# Patient Record
Sex: MALE | Race: OTHER
[De-identification: names, ages, dates, MRNs, and addresses within clinical notes are randomized per-mention and may not be internally consistent; named-entity substitution may affect disease eponyms.]

---

## 2018-02-25 ENCOUNTER — HOSPITAL ENCOUNTER (EMERGENCY)
Dept: HOSPITAL 72 - EMR | Age: 68
LOS: 1 days | Discharge: ANOTHER HOSPITAL | End: 2018-02-26
Payer: SELF-PAY

## 2018-02-25 VITALS — WEIGHT: 130 LBS | HEIGHT: 66 IN | BODY MASS INDEX: 20.89 KG/M2

## 2018-02-25 VITALS — DIASTOLIC BLOOD PRESSURE: 82 MMHG | SYSTOLIC BLOOD PRESSURE: 132 MMHG

## 2018-02-25 VITALS — SYSTOLIC BLOOD PRESSURE: 130 MMHG | DIASTOLIC BLOOD PRESSURE: 80 MMHG

## 2018-02-25 VITALS — SYSTOLIC BLOOD PRESSURE: 130 MMHG | DIASTOLIC BLOOD PRESSURE: 95 MMHG

## 2018-02-25 DIAGNOSIS — D18.1: ICD-10-CM

## 2018-02-25 DIAGNOSIS — F10.129: ICD-10-CM

## 2018-02-25 DIAGNOSIS — J32.9: ICD-10-CM

## 2018-02-25 DIAGNOSIS — Z23: ICD-10-CM
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
## 2018-02-25 DIAGNOSIS — S01.112A: ICD-10-CM

## 2018-02-25 DIAGNOSIS — S06.5X0A: Primary | ICD-10-CM
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
## 2018-02-25 LAB
ADD MANUAL DIFF: NO
BASOPHILS NFR BLD AUTO: 0.9 % (ref 0–2)
EOSINOPHIL NFR BLD AUTO: 3.9 % (ref 0–3)
ERYTHROCYTE [DISTWIDTH] IN BLOOD BY AUTOMATED COUNT: 12.6 % (ref 11.6–14.8)
HCT VFR BLD CALC: 44 % (ref 42–52)
HGB BLD-MCNC: 15.6 G/DL (ref 14.2–18)
LYMPHOCYTES NFR BLD AUTO: 20.2 % (ref 20–45)
MCV RBC AUTO: 93 FL (ref 80–99)
MONOCYTES NFR BLD AUTO: 7.6 % (ref 1–10)
NEUTROPHILS NFR BLD AUTO: 67.4 % (ref 45–75)
PLATELET # BLD: 226 K/UL (ref 150–450)
RBC # BLD AUTO: 4.73 M/UL (ref 4.7–6.1)
WBC # BLD AUTO: 5.9 K/UL (ref 4.8–10.8)

## 2018-02-25 PROCEDURE — 90471 IMMUNIZATION ADMIN: CPT

## 2018-02-25 PROCEDURE — 90715 TDAP VACCINE 7 YRS/> IM: CPT

## 2018-02-25 PROCEDURE — 84484 ASSAY OF TROPONIN QUANT: CPT

## 2018-02-25 PROCEDURE — 12011 RPR F/E/E/N/L/M 2.5 CM/<: CPT

## 2018-02-25 PROCEDURE — 80329 ANALGESICS NON-OPIOID 1 OR 2: CPT

## 2018-02-25 PROCEDURE — 80053 COMPREHEN METABOLIC PANEL: CPT

## 2018-02-25 PROCEDURE — 36415 COLL VENOUS BLD VENIPUNCTURE: CPT

## 2018-02-25 PROCEDURE — 93005 ELECTROCARDIOGRAM TRACING: CPT

## 2018-02-25 PROCEDURE — 70450 CT HEAD/BRAIN W/O DYE: CPT

## 2018-02-25 PROCEDURE — 85610 PROTHROMBIN TIME: CPT

## 2018-02-25 PROCEDURE — 99285 EMERGENCY DEPT VISIT HI MDM: CPT

## 2018-02-25 PROCEDURE — 85730 THROMBOPLASTIN TIME PARTIAL: CPT

## 2018-02-25 PROCEDURE — 85025 COMPLETE CBC W/AUTO DIFF WBC: CPT

## 2018-02-25 PROCEDURE — 82550 ASSAY OF CK (CPK): CPT

## 2018-02-25 NOTE — EMERGENCY ROOM REPORT
History of Present Illness


General


Chief Complaint:  Alcohol Intoxication


Source:  Patient, EMS





Present Illness


HPI


Patient brought by EMS after he has been drinking today.  He has a laceration 

of the left side of his forehead.  He can't tell us how this occurred.  He 

states he has some tenderness there but won't rate what it is.  He states his 

greater than 10 years for his last tetanus.  The patient is not answering most 

questions.


Allergies:  


Coded Allergies:  


     No Known Allergies (Unverified , 2/25/18)





Patient History


Limited by:  medical condition


Past Medical History:  see triage record


Social History:  Reports: alcohol use


Social History Narrative


states he works, but won't state job


Reviewed Nursing Documentation:  PMH: Agreed, PSxH: Agreed





Review of Systems


All Other Systems:  limited





Physical Exam





Vital Signs








  Date Time  Temp Pulse Resp B/P (MAP) Pulse Ox O2 Delivery O2 Flow Rate FiO2


 


2/25/18 16:34 97.8 78 16 130/9 98   





 97.9       








Sp02 EP Interpretation:  reviewed, normal


General Appearance:  well appearing, no apparent distress, other - GCS 14


Head:  normocephalic, other - 1 cm laceration L eyebrow


Eyes:  bilateral eye PERRL, bilateral eye Scleral Injection


ENT:  moist mucus membranes - poor dentition


Neck:  full range of motion, supple, no bony tend


Respiratory:  chest non-tender, lungs clear, normal breath sounds


Cardiovascular #1:  regular rate, rhythm, no edema


Cardiovascular #2:  2+ radial (R)


Gastrointestinal:  normal inspection, normal bowel sounds, non tender, no mass, 

non-distended


Musculoskeletal:  back normal, normal range of motion


Neurologic:  alert, motor strength/tone normal, sensory intact, speech normal, 

other - ataxia, oriented - X2


Psychiatric:  other - poor judgement


Reflexes:  2+ knee (R), 2+ knee (L)


Skin:  warm/dry, laceration - L eyebrow





Procedures


Laceration/Wound Repair


Laceration/Wound Repair :  


   Consent:  Verbal


   Wound Location:  face


   Wound's Depth, Shape:  into muscle


   Wound Length (cm):  1


   Wound Explored:  clean


   Irrigated w/ Saline (ccs):  10


   Betadine Prep?:  Yes


   Wound Debrided:  None


   Wound Repaired With:  Dermabond


   Layer Closure?:  No


   Sterile Dressing Applied?:  No


   Patient Tolerated:  Well


   Complications:  None





Medical Decision Making


Medical:  Alcohol Abuse


Behavioral:  Other - subdurals


Reaction to Intervention:  No change


Restraint Reassesment


Still attempting to leave.  Needs transfer to [facility].  Continued NB restraints


Diagnostic Impression:  


 Primary Impression:  


 Subdural hematoma


 Additional Impressions:  


 Subdural hygroma


 Acute alcoholic intoxication


 Qualified Codes:  F10.929 - Alcohol use, unspecified with intoxication, 

unspecified


 Head trauma


 Qualified Codes:  S09.90XA - Unspecified injury of head, initial encounter


 Facial laceration


 Qualified Codes:  S01.81XA - Laceration without foreign body of other part of 

head, initial encounter


ER Course


Patient presents with facial head trauma after drinking alcohol.  Differential 

includes a brain bleed, contusion after he has a laceration over left eyebrow 

that needs repair.  This will be done with Dermabond.  CT head is indicated.  

There's no neck pain at this time.





Patient with unsteady gait.  Trying to leave.  Patient restrained - non-

behavioral.  Not heed direction.





Repaired L eyebrow laceration with dermabond.





CT with hygromas vs subdurals.  Concern over mass effect (regarding chronicity).





Sending CT to [facility] and discussing with trauma and neurosurgery.





Labs ordered.





Presented to [facility]. Accepted in transfer Dr. Boateng, trauma and Dr. Lala, 

neurosurgery.





Explained problem (in Cameroonian) to patient multiple times.  He does not grasp 

the seriousness of the problem.





Patient stable for transfer [facility].





Laboratory Tests








Test


  2/25/18


23:23


 


White Blood Count


  5.9 K/UL


(4.8-10.8)


 


Red Blood Count


  4.73 M/UL


(4.70-6.10)


 


Hemoglobin


  15.6 G/DL


(14.2-18.0)


 


Hematocrit


  44.0 %


(42.0-52.0)


 


Mean Corpuscular Volume 93 FL (80-99)  


 


Mean Corpuscular Hemoglobin


  33.0 PG


(27.0-31.0)  H


 


Mean Corpuscular Hemoglobin


Concent 35.5 G/DL


(32.0-36.0)


 


Red Cell Distribution Width


  12.6 %


(11.6-14.8)


 


Platelet Count


  226 K/UL


(150-450)


 


Mean Platelet Volume


  7.7 FL


(6.5-10.1)


 


Neutrophils (%) (Auto)


  67.4 %


(45.0-75.0)


 


Lymphocytes (%) (Auto)


  20.2 %


(20.0-45.0)


 


Monocytes (%) (Auto)


  7.6 %


(1.0-10.0)


 


Eosinophils (%) (Auto)


  3.9 %


(0.0-3.0)  H


 


Basophils (%) (Auto)


  0.9 %


(0.0-2.0)


 


Prothrombin Time


  10.6 SEC


(9.30-11.50)


 


Prothrombin Time INR 1.0 (0.9-1.1)  


 


PTT


  29 SEC (23-33)


 


 


Sodium Level


  149 MMOL/L


(136-145)  H


 


Potassium Level


  3.5 MMOL/L


(3.5-5.1)


 


Chloride Level


  110 MMOL/L


()  H


 


Carbon Dioxide Level


  29 MMOL/L


(21-32)


 


Anion Gap


  10 mmol/L


(5-15)


 


Blood Urea Nitrogen


  11 mg/dL


(7-18)


 


Creatinine


  0.8 MG/DL


(0.55-1.30)


 


Estimate Glomerular


Filtration Rate > 60 mL/min


(>60)


 


Glucose Level


  92 MG/DL


()


 


Calcium Level


  8.2 MG/DL


(8.5-10.1)  L


 


Total Bilirubin


  0.5 MG/DL


(0.2-1.0)


 


Aspartate Amino Transferase


(AST) 25 U/L (15-37)


 


 


Alanine Aminotransferase (ALT)


  21 U/L (12-78)


 


 


Alkaline Phosphatase


  79 U/L


()


 


Total Creatine Kinase


  244 U/L


()


 


Troponin I


  0.000 ng/mL


(0.000-0.056)


 


Total Protein


  6.8 G/DL


(6.4-8.2)


 


Albumin


  3.3 G/DL


(3.4-5.0)  L


 


Globulin 3.5 g/dL  


 


Albumin/Globulin Ratio


  0.9 (1.0-2.7)


L


 


Serum Alcohol 213 mg/dL  








EKG Diagnostic Results


Rate:  normal


Rhythm:  NSR


ST Segments:  no acute changes - RBBB





Rhythm Strip Diag. Results


EP Interpretation:  yes


Rhythm:  NSR, no PVC's, no ectopy





CT/MRI/US Diagnostic Results


CT/MRI/US Diagnostic Results :  


   Imaging Test Ordered:  head


   Impression


Bilateral subdural hygromas vs hematomas L>R (14 vs 11 mm) with effacement and 

mass effect.  No fx.





Last Vital Signs








  Date Time  Temp Pulse Resp B/P (MAP) Pulse Ox O2 Delivery O2 Flow Rate FiO2


 


2/26/18 02:49 98.3 76 16 134/77 96 Room Air  





 98.3       








Status:  improved


Disposition:  XFER SHT-TRM HOSP


Condition:  Serious











Reji Soto M.D. Feb 25, 2018 17:43

## 2018-02-26 VITALS — SYSTOLIC BLOOD PRESSURE: 134 MMHG | DIASTOLIC BLOOD PRESSURE: 77 MMHG

## 2018-02-26 VITALS — SYSTOLIC BLOOD PRESSURE: 152 MMHG | DIASTOLIC BLOOD PRESSURE: 82 MMHG

## 2018-02-26 LAB
ALBUMIN SERPL-MCNC: 3.3 G/DL (ref 3.4–5)
ALBUMIN/GLOB SERPL: 0.9 {RATIO} (ref 1–2.7)
ALP SERPL-CCNC: 79 U/L (ref 46–116)
ALT SERPL-CCNC: 21 U/L (ref 12–78)
ANION GAP SERPL CALC-SCNC: 10 MMOL/L (ref 5–15)
APTT BLD: 29 SEC (ref 23–33)
AST SERPL-CCNC: 25 U/L (ref 15–37)
BILIRUB SERPL-MCNC: 0.5 MG/DL (ref 0.2–1)
BUN SERPL-MCNC: 11 MG/DL (ref 7–18)
CALCIUM SERPL-MCNC: 8.2 MG/DL (ref 8.5–10.1)
CHLORIDE SERPL-SCNC: 110 MMOL/L (ref 98–107)
CK SERPL-CCNC: 244 U/L (ref 26–308)
CO2 SERPL-SCNC: 29 MMOL/L (ref 21–32)
CREAT SERPL-MCNC: 0.8 MG/DL (ref 0.55–1.3)
GLOBULIN SER-MCNC: 3.5 G/DL
INR PPP: 1 (ref 0.9–1.1)
POTASSIUM SERPL-SCNC: 3.5 MMOL/L (ref 3.5–5.1)
SODIUM SERPL-SCNC: 149 MMOL/L (ref 136–145)

## 2018-02-26 NOTE — DIAGNOSTIC IMAGING REPORT
Indication: Headache

 

Technique: Contiguous 5 mm thick transaxial imaging of the head obtained in a Siemens

Sensation 64 slice CT scanner.  Soft tissue and bone windows generated.  Automatic

Exposure Control was utilized.

 

 

Total Dose length Product (DLP):  3789.95 mGycm

 

CT Dose Index Volume (CTDIvol):   70.38,70.38,70.38 mGy

 

Comparison: none

 

Findings: There are hypodense CSF attenuation bilateral cerebral fluid collections

probably chronic subdural hematoma slightly larger on the left compared to the right.

There are no reference prior studies for comparison. Generalized atrophy of the

cerebrum is present. Generalized atrophy of the cerebellum also noted. There is no

edema. There may be slight compression of the underlying cerebrum. There is no

midline shift. Ventricles and basal cisterns appear normal.

 

There is left periorbital soft tissue swelling present. There is a probable nasal

fracture acuity indeterminate. Minimal mucosal thickening noted within paranasal

sinuses.

 

IMPRESSION:

 

Bilateral cerebral convexity subdural collections probably chronic subdural hematomas

versus CSF hygromas. Suggest follow-up as warranted clinically.

 

Left periorbital soft tissue contusion.

 

Nasal fracture, acuity indeterminate.

 

Mild sinusitis

 

[facility] has communicated the preliminary results to the Emergency

Department.  Their findings are largely concordant with this report.

 

The CT scanner at [facility] is accredited by the [employer] College [employer]

[employer] and the scans are performed using dose optimization techniques as

appropriate to a performed exam including Automatic Exposure control.

## 2018-02-28 NOTE — CARDIOLOGY REPORT
--------------- APPROVED REPORT --------------





EKG Measurement

Heart [id number]

OH [id number]

[id number]

[id number]

[id number]





Normal sinus rhythm

Right bundle branch block

Abnormal ECG